# Patient Record
Sex: MALE | Race: WHITE | NOT HISPANIC OR LATINO | Employment: FULL TIME | RURAL
[De-identification: names, ages, dates, MRNs, and addresses within clinical notes are randomized per-mention and may not be internally consistent; named-entity substitution may affect disease eponyms.]

---

## 2024-10-03 DIAGNOSIS — M54.50 LOW BACK PAIN: Primary | ICD-10-CM

## 2024-11-05 DIAGNOSIS — M25.522 LEFT ELBOW PAIN: Primary | ICD-10-CM

## 2024-11-07 ENCOUNTER — HOSPITAL ENCOUNTER (OUTPATIENT)
Dept: RADIOLOGY | Facility: HOSPITAL | Age: 39
Discharge: HOME | End: 2024-11-07
Attending: NURSE PRACTITIONER
Payer: OTHER GOVERNMENT

## 2024-11-07 ENCOUNTER — OFFICE VISIT (OUTPATIENT)
Dept: ORTHOPEDICS | Facility: CLINIC | Age: 39
End: 2024-11-07
Payer: OTHER GOVERNMENT

## 2024-11-07 DIAGNOSIS — M25.522 LEFT ELBOW PAIN: ICD-10-CM

## 2024-11-07 DIAGNOSIS — M25.522 LEFT ELBOW PAIN: Primary | ICD-10-CM

## 2024-11-07 PROCEDURE — 73070 X-RAY EXAM OF ELBOW: CPT | Mod: TC,LT

## 2024-11-07 PROCEDURE — 99999 PR PBB SHADOW E&M-EST. PATIENT-LVL III: CPT | Mod: PBBFAC,,, | Performed by: NURSE PRACTITIONER

## 2024-11-07 PROCEDURE — 73070 X-RAY EXAM OF ELBOW: CPT | Mod: 26,LT,, | Performed by: RADIOLOGY

## 2024-11-07 PROCEDURE — 99203 OFFICE O/P NEW LOW 30 MIN: CPT | Mod: S$PBB,,, | Performed by: NURSE PRACTITIONER

## 2024-11-07 PROCEDURE — 99213 OFFICE O/P EST LOW 20 MIN: CPT | Mod: PBBFAC,25 | Performed by: NURSE PRACTITIONER

## 2024-11-07 RX ORDER — DICLOFENAC SODIUM 75 MG/1
75 TABLET, DELAYED RELEASE ORAL 2 TIMES DAILY
Qty: 45 TABLET | Refills: 0 | Status: SHIPPED | OUTPATIENT
Start: 2024-11-07

## 2024-11-07 NOTE — PROGRESS NOTES
39 y.o. Male returns to clinic for a follow up visit regarding     ICD-10-CM ICD-9-CM   1. Left elbow pain  M25.522 719.42        Patient is complaining of pain in his left distal bicep that started in spring after he was doing pull-ups points to pain in the bend of his left elbow.  Pain started in the spring.  He has been through several weeks of formal PT, reports it has improved a good bit but still having occasional pain when he does biceps curls her make certain motions.  He has not tried anti-inflammatories.  Has not fully rested it       History reviewed. No pertinent past medical history.  History reviewed. No pertinent surgical history.      PHYSICAL EXAMINATION:            Left Elbow Exam     Inspection   Scars: absent  Effusion: absent  Bruising: absent    Range of Motion   Extension:  normal   Flexion:  normal   Pronation:  normal   Supination:  normal     Other   Sensation: normal        Muscle Strength   Left Upper Extremity  :  5/5     Vascular Exam       Left Pulses      Radial:                    2+      IMAGING:  No results found.   EXAMINATION:  XR ELBOW 2 VIEWS LEFT     CLINICAL HISTORY:  Pain in left elbow     TECHNIQUE:  Axial and lateral views are obtained through the left elbow.     FINDINGS:  An acute fracture of the humerus radius or ulna is not seen.  Dislocation is not noted.  There is positive fat pad sign consistent with a joint effusion however.  A discontinuous olecranon bone spur is noted without overlying soft tissue edema.     Impression:     Positive left elbow joint effusion.  Discontinuous olecranon bone spur without adjacent soft tissue swelling noted.  Otherwise negative plain x-rays of the left elbow        Electronically signed by:Bob Tolentino MD  Date:                                            11/08/2024  Time:                                           11:39        Exam Ended: 11/07/24 14:38 CST Last Resulted: 11/08/24 11:39     ASSESSMENT:      ICD-10-CM  ICD-9-CM   1. Left elbow pain  M25.522 719.42       PLAN:     -Findings and treatment options were discussed with the patient  -All questions answered  Recommend rest.  He has completed several weeks of formal therapy.  No bicep curls or weight lifting the activates the bicep.  Diclofenac p.o. b.i.d..  We will bring him back in 6 weeks if no better. With Dr Singleton    There are no Patient Instructions on file for this visit.      No orders of the defined types were placed in this encounter.        Procedures